# Patient Record
Sex: MALE | Race: WHITE | NOT HISPANIC OR LATINO | Employment: FULL TIME | URBAN - METROPOLITAN AREA
[De-identification: names, ages, dates, MRNs, and addresses within clinical notes are randomized per-mention and may not be internally consistent; named-entity substitution may affect disease eponyms.]

---

## 2018-04-25 ENCOUNTER — OFFICE VISIT - HEALTHEAST (OUTPATIENT)
Dept: FAMILY MEDICINE | Facility: CLINIC | Age: 33
End: 2018-04-25

## 2018-04-25 DIAGNOSIS — R11.0 NAUSEA: ICD-10-CM

## 2021-06-01 VITALS — BODY MASS INDEX: 18.47 KG/M2 | WEIGHT: 140 LBS

## 2021-06-17 NOTE — PROGRESS NOTES
ASSESSMENT:  1. Nausea  Acute onset of nausea worse in the morning.  By patient history and presentation I am more suspicious that this is a manifestation of anxiety and stress rather than a GI disorder itself.      PLAN:  1.  I have the patient take Prilosec 20 mg each morning for 2 weeks  2.  I recommend that the patient start counseling or therapy.  3.  Certainly if there is a change or progression of his GI symptoms may need to consider a GI workup.  4.  It is not at all clear to me whether or not the patient is reacting to acute stress from work, there is mental health issues in the family, so it is possible that the patient may have some longer-term anxiety issues but this remains to be determined.  5.  Follow-up as needed, patient should be seen within the next year for physical.    No orders of the defined types were placed in this encounter.    There are no discontinued medications.    No Follow-up on file.    CHIEF COMPLAINT:  Chief Complaint   Patient presents with     Dizziness     almost passed out at work last week- low blood sugar- dizzy o/o      Nausea     o/o worse in the am        SUBJECTIVE:  Bala is a 33 y.o. male presenting to the clinic today with dizziness. He has been experiencing episodes of dizziness and nausea for the last week. The issue first presented this past Thursday. He notes that he skipped lunch and was on his feet most of the day and started to feel like he was going to pass out. He was able to subdue the feelings by sitting down. He notes that the following day he continued to feel dizzy which was accompanied by nausea; this persisted over the weekend. He is starting to feel a great deal of anxiety over the nausea. He has yet to throw up, but when he wakes in the morning he is nauseous to the point of not being able to eat. This, in his opinion, then perpetuates the dizziness. As previously stated, the feeling of being ill is causing him a great deal of anxiety. It is  unclear to him, however, if the anxiety is secondary to the nausea or vice versa. He has been experiencing increased stress at work as the projects he has been managing have become too large for just one person to handle. Of note, he endorses a strong family history of depression with anxiety. He is not currently treating his symptoms pharmacologically. He has not found any added difficulty in falling or asleep or sleeping though the night. He denies a history of stomach issues.     REVIEW OF SYSTEMS:   He experienced frequent cold-like symptoms this past winter.   All other systems are negative.    PFSH:  Immunization History   Administered Date(s) Administered     Hep A, historic 07/12/2004, 01/13/2005     Hep B, historic 07/12/2004, 08/18/2004, 01/13/2005     Influenza, seasonal,quad inj 36+ mos 10/03/2016     MMR 04/15/1998     Meningococcal MCV4P 07/12/2004     Td,adult,historic,unspecified 04/15/1998     Tdap 11/15/2011     Social History     Social History     Marital status:      Spouse name: N/A     Number of children: 0     Years of education: N/A     Occupational History     Electrical Engineering, Physics      Social History Main Topics     Smoking status: Never Smoker     Smokeless tobacco: Never Used     Alcohol use 3.0 - 3.6 oz/week     5 - 6 Standard drinks or equivalent per week     Drug use: No     Sexual activity: Not on file     Other Topics Concern     Not on file     Social History Narrative    Diet- Regular        Exercise- Some, not as much as he would like. Kettlebell class 2x per week.     No past medical history on file.  Family History   Problem Relation Age of Onset     Mental illness Father      Mood Disorder     Depression Father      Maybe Bipolar     Arrhythmia Mother      No Medical Problems Sister      Heart attack Paternal Uncle      Heart disease Paternal Uncle      Alzheimer's disease Maternal Grandmother      Colon cancer Paternal Grandmother        MEDICATIONS:  No  current outpatient prescriptions on file.     No current facility-administered medications for this visit.        TOBACCO USE:  History   Smoking Status     Never Smoker   Smokeless Tobacco     Never Used       VITALS:  Vitals:    04/25/18 1158   BP: 100/60   Pulse: 70   Weight: 140 lb (63.5 kg)     Wt Readings from Last 3 Encounters:   04/25/18 140 lb (63.5 kg)   10/03/16 140 lb (63.5 kg)       PHYSICAL EXAM:  Constitutional:   Reveals a pleasant male that appears stated age.  Vitals: per nursing notes.  HEENT:  Ears:  External canals, TMs clear.    Eyes:  EOMs full, PERRL.  Lungs: Clear to A&P without rales or wheezes.  Respiratory effort normal.  Cardiac:   Regular rate and rhythm, normal S1, S2, no murmur or gallop.  Abdomen: Soft, non-tender.   Musculoskeletal: No peripheral swelling.  Neuro:  Alert and oriented. Cranial nerves, motor, sensory exams are intact.  No gross focal deficits.  Psychiatric:  Memory intact, mood appropriate.    DATA REVIEWED:  Additional History from Old Records Summarized (2): None.   Decision to Obtain Records (1): None.   Radiology Tests Summarized or Ordered (1): None.   Labs Reviewed or Ordered (1): None.   Medicine Test Summarized or Ordered (1): None.   Independent Review of EKG, X-RAY, or RAPID STREP (2 each): None.     The visit lasted a total of 16 minutes face to face with the patient. Over 50% of the time was spent counseling and educating the patient about anxiety.    IGaston, am scribing for and in the presence of, Dr. Sandoval.    I, Dr. Sandoval, personally performed the services described in this documentation, as scribed by Gaston Young in my presence, and it is both accurate and complete.    Total data points: 0

## 2022-07-15 ENCOUNTER — E-VISIT (OUTPATIENT)
Dept: URGENT CARE | Facility: CLINIC | Age: 37
End: 2022-07-15
Payer: COMMERCIAL

## 2022-07-15 DIAGNOSIS — R21 RASH AND NONSPECIFIC SKIN ERUPTION: Primary | ICD-10-CM

## 2022-07-15 PROCEDURE — 99421 OL DIG E/M SVC 5-10 MIN: CPT | Performed by: EMERGENCY MEDICINE

## 2022-07-15 RX ORDER — CLOTRIMAZOLE 1 %
CREAM (GRAM) TOPICAL 2 TIMES DAILY
Qty: 60 G | Refills: 1 | Status: SHIPPED | OUTPATIENT
Start: 2022-07-15

## 2022-07-17 ENCOUNTER — HEALTH MAINTENANCE LETTER (OUTPATIENT)
Age: 37
End: 2022-07-17

## 2022-08-24 ASSESSMENT — ENCOUNTER SYMPTOMS
NERVOUS/ANXIOUS: 0
MYALGIAS: 0
HEMATOCHEZIA: 0
HEADACHES: 0
FREQUENCY: 0
COUGH: 0
NAUSEA: 0
EYE PAIN: 0
CONSTIPATION: 0
SORE THROAT: 0
JOINT SWELLING: 0
DIZZINESS: 0
FEVER: 0
ABDOMINAL PAIN: 0
SHORTNESS OF BREATH: 0
DYSURIA: 0
CHILLS: 0
ARTHRALGIAS: 1
PARESTHESIAS: 0
HEARTBURN: 0
HEMATURIA: 0
WEAKNESS: 0
DIARRHEA: 0
PALPITATIONS: 0

## 2022-08-30 ENCOUNTER — OFFICE VISIT (OUTPATIENT)
Dept: FAMILY MEDICINE | Facility: CLINIC | Age: 37
End: 2022-08-30
Payer: COMMERCIAL

## 2022-08-30 VITALS
OXYGEN SATURATION: 99 % | DIASTOLIC BLOOD PRESSURE: 72 MMHG | SYSTOLIC BLOOD PRESSURE: 118 MMHG | HEIGHT: 73 IN | WEIGHT: 150.8 LBS | RESPIRATION RATE: 18 BRPM | TEMPERATURE: 98.1 F | HEART RATE: 63 BPM | BODY MASS INDEX: 19.99 KG/M2

## 2022-08-30 DIAGNOSIS — Z13.220 SCREENING FOR HYPERLIPIDEMIA: ICD-10-CM

## 2022-08-30 DIAGNOSIS — Z11.4 SCREENING FOR HIV (HUMAN IMMUNODEFICIENCY VIRUS): ICD-10-CM

## 2022-08-30 DIAGNOSIS — Z00.00 ROUTINE GENERAL MEDICAL EXAMINATION AT A HEALTH CARE FACILITY: Primary | ICD-10-CM

## 2022-08-30 DIAGNOSIS — Z11.59 NEED FOR HEPATITIS C SCREENING TEST: ICD-10-CM

## 2022-08-30 DIAGNOSIS — Z13.1 SCREENING FOR DIABETES MELLITUS: ICD-10-CM

## 2022-08-30 LAB
HCV AB SERPL QL IA: NONREACTIVE
HIV 1+2 AB+HIV1 P24 AG SERPL QL IA: NONREACTIVE

## 2022-08-30 PROCEDURE — 90471 IMMUNIZATION ADMIN: CPT | Performed by: FAMILY MEDICINE

## 2022-08-30 PROCEDURE — 36415 COLL VENOUS BLD VENIPUNCTURE: CPT | Performed by: FAMILY MEDICINE

## 2022-08-30 PROCEDURE — 90715 TDAP VACCINE 7 YRS/> IM: CPT | Performed by: FAMILY MEDICINE

## 2022-08-30 PROCEDURE — 87389 HIV-1 AG W/HIV-1&-2 AB AG IA: CPT | Performed by: FAMILY MEDICINE

## 2022-08-30 PROCEDURE — 80061 LIPID PANEL: CPT | Performed by: FAMILY MEDICINE

## 2022-08-30 PROCEDURE — 99385 PREV VISIT NEW AGE 18-39: CPT | Mod: 25 | Performed by: FAMILY MEDICINE

## 2022-08-30 PROCEDURE — 82947 ASSAY GLUCOSE BLOOD QUANT: CPT | Performed by: FAMILY MEDICINE

## 2022-08-30 PROCEDURE — 86803 HEPATITIS C AB TEST: CPT | Performed by: FAMILY MEDICINE

## 2022-08-30 RX ORDER — NICOTINE POLACRILEX 4 MG/1
20 GUM, CHEWING ORAL DAILY
COMMUNITY

## 2022-08-30 ASSESSMENT — ENCOUNTER SYMPTOMS
FEVER: 0
DIZZINESS: 0
HEADACHES: 0
CHILLS: 0
PALPITATIONS: 0
CONSTIPATION: 0
NAUSEA: 0
HEMATURIA: 0
ABDOMINAL PAIN: 0
NERVOUS/ANXIOUS: 0
DYSURIA: 0
DIARRHEA: 0
SHORTNESS OF BREATH: 0
ARTHRALGIAS: 1
EYE PAIN: 0
SORE THROAT: 0
PARESTHESIAS: 0
MYALGIAS: 0
WEAKNESS: 0
HEARTBURN: 0
FREQUENCY: 0
HEMATOCHEZIA: 0
COUGH: 0
JOINT SWELLING: 0

## 2022-08-30 NOTE — NURSING NOTE
Prior to immunization administration, verified patients identity using patient s name and date of birth. Please see Immunization Activity for additional information.     Screening Questionnaire for Adult Immunization    Are you sick today?   No   Do you have allergies to medications, food, a vaccine component or latex?   No   Have you ever had a serious reaction after receiving a vaccination?   No   Do you have a long-term health problem with heart, lung, kidney, or metabolic disease (e.g., diabetes), asthma, a blood disorder, no spleen, complement component deficiency, a cochlear implant, or a spinal fluid leak?  Are you on long-term aspirin therapy?   No   Do you have cancer, leukemia, HIV/AIDS, or any other immune system problem?   No   Do you have a parent, brother, or sister with an immune system problem?   No   In the past 3 months, have you taken medications that affect  your immune system, such as prednisone, other steroids, or anticancer drugs; drugs for the treatment of rheumatoid arthritis, Crohn s disease, or psoriasis; or have you had radiation treatments?   No   Have you had a seizure, or a brain or other nervous system problem?   No   During the past year, have you received a transfusion of blood or blood    products, or been given immune (gamma) globulin or antiviral drug?   No   For women: Are you pregnant or is there a chance you could become       pregnant during the next month?   No   Have you received any vaccinations in the past 4 weeks?   No     Immunization questionnaire answers were all negative.        Per orders of Dr. Hinds, injection of tdap (adacel) given by Myra Castillo. Patient instructed to remain in clinic for 15 minutes afterwards, and to report any adverse reaction to me immediately.       Screening performed by Myra Castillo on 8/30/2022 at 8:45 AM.

## 2022-08-30 NOTE — PROGRESS NOTES
SUBJECTIVE:   CC: Bala Christianson is an 37 year old male who presents for preventative health visit.     Patient has been advised of split billing requirements and indicates understanding: Yes  Healthy Habits:     Getting at least 3 servings of Calcium per day:  Yes    Bi-annual eye exam:  Yes    Dental care twice a year:  Yes    Sleep apnea or symptoms of sleep apnea:  None    Diet:  Regular (no restrictions)    Frequency of exercise:  2-3 days/week    Duration of exercise:  15-30 minutes    Taking medications regularly:  Yes    Medication side effects:  Not applicable    PHQ-2 Total Score: 0    Additional concerns today:  Yes    Today's PHQ-2 Score:   PHQ-2 ( 1999 [employer]) 8/24/2022   Q1: Little interest or pleasure in doing things 0   Q2: Feeling down, depressed or hopeless 0   PHQ-2 Score 0   Q1: Little interest or pleasure in doing things Not at all   Q2: Feeling down, depressed or hopeless Not at all   PHQ-2 Score 0     Abuse: Current or Past(Physical, Sexual or Emotional)- No  Do you feel safe in your environment? Yes    Have you ever done Advance Care Planning? (For example, a Health Directive, POLST, or a discussion with a medical provider or your loved ones about your wishes): No, advance care planning information given to patient to review.  Patient plans to discuss their wishes with loved ones or provider.      Social History     Tobacco Use     Smoking status: Never Smoker     Smokeless tobacco: Never Used   Substance Use Topics     Alcohol use: Yes     Alcohol/week: 5.0 - 6.0 standard drinks     If you drink alcohol do you typically have >3 drinks per day or >7 drinks per week? Yes    Alcohol Use 8/24/2022   Prescreen: >3 drinks/day or >7 drinks/week? No   No flowsheet data found.    Last PSA: No results found for: PSA    Reviewed orders with patient. Reviewed health maintenance and updated orders accordingly - Yes     Reviewed and updated as needed this visit by clinical staff    Reviewed and  "updated as needed this visit by Provider    History of gerd. Been to GI couple of years ago.   Sometime gets muscle spasm. Planning to follow up with them. Using omeprazole every other day 20mg per day. Father also has GERD.     20 yrs ago - extra vertebra in his back. Tweaks with basic movement as getting old. Sometime shooting pain and back strength gets worse. Couple of days of symptoms when happens. No radiation of pain.     Once in a while - heart skips a beat, sometime beats faster. Infrequently 1 times per week. Thyroid and hemoglobin were fine. Anxiety in waves. No previous EKG.     No previous surgery or hospitalization.     Work - desk job - .    Living with his wife and expecting baby girl in a month.     Exercise - 2-3 times per week.     Review of Systems   Constitutional: Negative for chills and fever.   HENT: Negative for congestion, ear pain, hearing loss and sore throat.    Eyes: Negative for pain and visual disturbance.   Respiratory: Negative for cough and shortness of breath.    Cardiovascular: Negative for chest pain, palpitations and peripheral edema.   Gastrointestinal: Negative for abdominal pain, constipation, diarrhea, heartburn, hematochezia and nausea.   Genitourinary: Negative for dysuria, frequency, genital sores, hematuria, impotence, penile discharge and urgency.   Musculoskeletal: Positive for arthralgias. Negative for joint swelling and myalgias.   Skin: Negative for rash.   Neurological: Negative for dizziness, weakness, headaches and paresthesias.   Psychiatric/Behavioral: Negative for mood changes. The patient is not nervous/anxious.      OBJECTIVE:   /72 (BP Location: Right arm, Patient Position: Chair, Cuff Size: Adult Regular)   Pulse 63   Temp 98.1  F (36.7  C)   Resp 18   Ht 1.854 m (6' 1\")   Wt 68.4 kg (150 lb 12.8 oz)   SpO2 99%   BMI 19.90 kg/m      Physical Exam  GENERAL: healthy, alert and no distress  EYES: Eyes grossly normal to " "inspection, PERRL and conjunctivae and sclerae normal  HENT: ear canals and TM's normal, nose and mouth without ulcers or lesions  NECK: no adenopathy, no asymmetry, masses, or scars and thyroid normal to palpation  RESP: lungs clear to auscultation - no rales, rhonchi or wheezes  CV: regular rate and rhythm, normal S1 S2, no S3 or S4, no murmur, click or rub, no peripheral edema and peripheral pulses strong  ABDOMEN: soft, nontender, no hepatosplenomegaly, no masses and bowel sounds normal   (male): normal male genitalia without lesions or urethral discharge, no hernia  MS: no gross musculoskeletal defects noted, no edema  SKIN: no suspicious lesions or rashes  NEURO: Normal strength and tone, mentation intact and speech normal  PSYCH: mentation appears normal, affect normal/bright        ASSESSMENT/PLAN:   (Z00.00) Routine general medical examination at a health care facility  (primary encounter diagnosis)  Comment:    Plan:  Gerd,back pain and palpitation - will reach out to us if any intervention needed. Right now no concern and seeing GI.     (Z11.4) Screening for HIV (human immunodeficiency virus)  Comment:    Plan: HIV Antigen Antibody Combo             (Z11.59) Need for hepatitis C screening test  Comment:    Plan: Hepatitis C Screen Reflex to HCV RNA Quant and         Genotype             (Z13.220) Screening for hyperlipidemia  Comment:    Plan: Lipid panel reflex to direct LDL Non-fasting             (Z13.1) Screening for diabetes mellitus  Comment:    Plan: Glucose                   COUNSELING:   Reviewed preventive health counseling, as reflected in patient instructions  Special attention given to:        Regular exercise       Healthy diet/nutrition       Vision screening       Hearing screening       Colorectal cancer screening    Estimated body mass index is 18.47 kg/m  as calculated from the following:    Height as of 10/3/16: 1.854 m (6' 1\").    Weight as of 4/25/18: 63.5 kg (140 lb).     Weight " management plan noted, stable and monitoring    He reports that he has never smoked. He has never used smokeless tobacco.    Counseling Resources:  ATP IV Guidelines  Pooled Cohorts Equation Calculator  FRAX Risk Assessment  ICSI Preventive Guidelines  Dietary Guidelines for Americans, 2010  USDA's MyPlate  ASA Prophylaxis  Lung CA Screening    Kwabena Hinds MD, MD  [facility]

## 2022-08-31 LAB
CHOLEST SERPL-MCNC: 116 MG/DL
FASTING STATUS PATIENT QL REPORTED: YES
FASTING STATUS PATIENT QL REPORTED: YES
GLUCOSE BLD-MCNC: 102 MG/DL (ref 70–99)
HDLC SERPL-MCNC: 44 MG/DL
LDLC SERPL CALC-MCNC: 59 MG/DL
NONHDLC SERPL-MCNC: 72 MG/DL
TRIGL SERPL-MCNC: 63 MG/DL

## 2022-09-25 ENCOUNTER — HEALTH MAINTENANCE LETTER (OUTPATIENT)
Age: 37
End: 2022-09-25

## 2023-10-14 ENCOUNTER — HEALTH MAINTENANCE LETTER (OUTPATIENT)
Age: 38
End: 2023-10-14

## 2024-12-07 ENCOUNTER — HEALTH MAINTENANCE LETTER (OUTPATIENT)
Age: 39
End: 2024-12-07